# Patient Record
Sex: MALE | Race: WHITE | NOT HISPANIC OR LATINO
[De-identification: names, ages, dates, MRNs, and addresses within clinical notes are randomized per-mention and may not be internally consistent; named-entity substitution may affect disease eponyms.]

---

## 2024-08-07 ENCOUNTER — APPOINTMENT (OUTPATIENT)
Dept: ORTHOPEDIC SURGERY | Facility: CLINIC | Age: 34
End: 2024-08-07

## 2024-08-07 PROBLEM — Z00.00 ENCOUNTER FOR PREVENTIVE HEALTH EXAMINATION: Status: ACTIVE | Noted: 2024-08-07

## 2024-08-07 PROCEDURE — 99203 OFFICE O/P NEW LOW 30 MIN: CPT

## 2024-08-07 NOTE — IMAGING
[de-identified] : On examination of the right ankle lateral ankle swelling is noted, ecchymosis present.  Skin is intact.  Tenderness over the ATFL, PTFL and CFL.  Tenderness over the malleolus.  No tenderness over the medial malleolus, no tenderness over the deltoid ligament.  No tenderness over the Achilles or Achilles.  No tenderness over the metatarsals.  No tenderness over the toes.  Restriction to plantarflexion, dorsiflexion, inversion and eversion.  X-ray right ankle reviewed from [facility], there is soft tissue swelling asymmetric laterally. Productive changes of the lateral malleolus likely chronic in nature. No acute fracture.

## 2024-08-07 NOTE — ASSESSMENT
[FreeTextEntry1] : Recommending tall cam walker boot.  We discussed rest, icing, elevation, anti-inflammatory.  He can be weightbearing as tolerated, although he does have crutches from the hospital.  Follow-up in few weeks for further evaluation and treatment.  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

## 2024-08-26 ENCOUNTER — APPOINTMENT (OUTPATIENT)
Dept: ORTHOPEDIC SURGERY | Facility: CLINIC | Age: 34
End: 2024-08-26
Payer: COMMERCIAL

## 2024-08-26 DIAGNOSIS — S93.491A SPRAIN OF OTHER LIGAMENT OF RIGHT ANKLE, INITIAL ENCOUNTER: ICD-10-CM

## 2024-08-26 PROCEDURE — L1902: CPT | Mod: RT

## 2024-08-26 PROCEDURE — 99203 OFFICE O/P NEW LOW 30 MIN: CPT | Mod: 25

## 2024-08-28 PROBLEM — S93.491A SPRAIN OF ANTERIOR TALOFIBULAR LIGAMENT OF RIGHT ANKLE, INITIAL ENCOUNTER: Status: ACTIVE | Noted: 2024-08-07

## 2024-08-28 NOTE — DISCUSSION/SUMMARY
[de-identified] : Pt here for follow up of their ankle sprain which occurred several weeks ago. Seeing me for the first time. Was seen by the urgent care who prescribed an assistive device. Doing better. No interval trauma. Does not endorse calf pain/SOB.     Alert/oriented x3 Pleasant and cooperative through exam. Slightly tender over dorsolateral ankle Ankle is stable to anterior drawer   Mild soft tissue swelling. ROM grossly preserved No instability or bony crepitus. Neuro intact distally   Imaging:     Prior xrays reviewed and found to be negative for fx/dislocation Ankle joint congruent.   Plan:     Continue with HEP Continue NSAIDS Ice/elevate as needed Follow up as needed